# Patient Record
Sex: FEMALE | URBAN - METROPOLITAN AREA
[De-identification: names, ages, dates, MRNs, and addresses within clinical notes are randomized per-mention and may not be internally consistent; named-entity substitution may affect disease eponyms.]

---

## 2024-04-16 ENCOUNTER — APPOINTMENT (RX ONLY)
Dept: URBAN - METROPOLITAN AREA CLINIC 54 | Facility: CLINIC | Age: 39
Setting detail: DERMATOLOGY
End: 2024-04-16

## 2024-04-16 DIAGNOSIS — D485 NEOPLASM OF UNCERTAIN BEHAVIOR OF SKIN: ICD-10-CM

## 2024-04-16 DIAGNOSIS — L73.2 HIDRADENITIS SUPPURATIVA: ICD-10-CM

## 2024-04-16 PROBLEM — D48.5 NEOPLASM OF UNCERTAIN BEHAVIOR OF SKIN: Status: ACTIVE | Noted: 2024-04-16

## 2024-04-16 PROCEDURE — ? ORDER TESTS

## 2024-04-16 PROCEDURE — 99203 OFFICE O/P NEW LOW 30 MIN: CPT

## 2024-04-16 PROCEDURE — ? DEFER

## 2024-04-16 PROCEDURE — ? COUNSELING

## 2024-04-16 PROCEDURE — ? OTHER

## 2024-04-16 NOTE — HPI: SKIN LESION
How Severe Is Your Skin Lesion?: mild
Is This A New Presentation, Or A Follow-Up?: Growths
Additional History: Patient stated she has been diagnosed with HS, and has had multiple surgeries.

## 2024-04-16 NOTE — PROCEDURE: DEFER
Detail Level: Detailed
Instructions (Optional): A) Left Upper Back  R/O EIC
Introduction Text (Please End With A Colon): The following procedure was deferred
X Size Of Lesion In Cm (Optional): 1.5
Procedure To Be Performed At Next Visit: Excision
Size Of Lesion In Cm (Optional): 1.6

## 2024-04-16 NOTE — PROCEDURE: ORDER TESTS
Billing Type: Third-Party Bill
Performing Laboratory: 0
Expected Date Of Service: 04/16/2024
Bill For Surgical Tray: no

## 2024-04-16 NOTE — PROCEDURE: OTHER
Note Text (......Xxx Chief Complaint.): This diagnosis correlates with the
Detail Level: Zone
Render Risk Assessment In Note?: no
Other (Free Text): Patient is already on clindamycin and doxycycline provided by PCP. Does not take doxy often due to it causing UTI’S. Previously prescribed Humira from a different dermatology office, reported minimal improvement.

## 2024-04-16 NOTE — PROCEDURE: MIPS QUALITY
Quality 130: Documentation Of Current Medications In The Medical Record: Current Medications Documented
Quality 431: Preventive Care And Screening: Unhealthy Alcohol Use - Screening: Patient not identified as an unhealthy alcohol user when screened for unhealthy alcohol use using a systematic screening method
Quality 394a: Meningococcal Immunizations For Adolescents: Patient did not have one dose of meningococcal vaccine on or between the patient's 11th and 13th birthdays.
Detail Level: Detailed
Quality 226: Preventive Care And Screening: Tobacco Use: Screening And Cessation Intervention: Patient screened for tobacco use and is an ex/non-smoker

## 2024-05-07 ENCOUNTER — APPOINTMENT (RX ONLY)
Dept: URBAN - METROPOLITAN AREA CLINIC 54 | Facility: CLINIC | Age: 39
Setting detail: DERMATOLOGY
End: 2024-05-07

## 2024-05-07 DIAGNOSIS — D485 NEOPLASM OF UNCERTAIN BEHAVIOR OF SKIN: ICD-10-CM

## 2024-05-07 DIAGNOSIS — L73.2 HIDRADENITIS SUPPURATIVA: ICD-10-CM

## 2024-05-07 PROBLEM — D48.5 NEOPLASM OF UNCERTAIN BEHAVIOR OF SKIN: Status: ACTIVE | Noted: 2024-05-07

## 2024-05-07 PROCEDURE — 99213 OFFICE O/P EST LOW 20 MIN: CPT

## 2024-05-07 PROCEDURE — ? DEFER

## 2024-05-07 PROCEDURE — ? OTHER

## 2024-05-07 PROCEDURE — ? PRESCRIPTION

## 2024-05-07 PROCEDURE — ? COUNSELING

## 2024-05-07 PROCEDURE — ? TREATMENT REGIMEN

## 2024-05-07 RX ORDER — SECUKINUMAB 150 MG/ML
INJECTION SUBCUTANEOUS
Qty: 1 | Refills: 0 | Status: ERX | COMMUNITY
Start: 2024-05-07

## 2024-05-07 RX ORDER — SECUKINUMAB 150 MG/ML
INJECTION SUBCUTANEOUS
Qty: 5 | Refills: 0 | Status: ERX

## 2024-05-07 RX ADMIN — SECUKINUMAB: 150 INJECTION SUBCUTANEOUS at 00:00

## 2024-05-22 RX ORDER — SECUKINUMAB 150 MG/ML
INJECTION SUBCUTANEOUS
Qty: 1 | Refills: 0 | Status: ERX

## 2024-05-22 RX ORDER — SECUKINUMAB 150 MG/ML
INJECTION SUBCUTANEOUS
Qty: 5 | Refills: 0 | Status: ERX

## 2024-11-21 ENCOUNTER — APPOINTMENT (RX ONLY)
Dept: URBAN - METROPOLITAN AREA CLINIC 54 | Facility: CLINIC | Age: 39
Setting detail: DERMATOLOGY
End: 2024-11-21

## 2024-11-21 ENCOUNTER — RX ONLY (OUTPATIENT)
Age: 39
Setting detail: RX ONLY
End: 2024-11-21

## 2024-11-21 DIAGNOSIS — L73.2 HIDRADENITIS SUPPURATIVA: ICD-10-CM

## 2024-11-21 PROCEDURE — ? TREATMENT REGIMEN

## 2024-11-21 PROCEDURE — 99213 OFFICE O/P EST LOW 20 MIN: CPT

## 2024-11-21 PROCEDURE — ? COUNSELING

## 2024-11-21 PROCEDURE — ? PRESCRIPTION

## 2024-11-21 RX ORDER — CLINDAMYCIN PHOSPHATE 10 MG/ML
SOLUTION TOPICAL BID
Qty: 60 | Refills: 0 | Status: CANCELLED

## 2024-11-21 RX ORDER — CLINDAMYCIN PHOSPHATE 10 MG/ML
SOLUTION TOPICAL BID
Qty: 60 | Refills: 0 | Status: CANCELLED | COMMUNITY
Start: 2024-11-21

## 2024-11-21 RX ORDER — CLINDAMYCIN PHOSPHATE 10 MG/ML
SOLUTION TOPICAL
Qty: 60 | Refills: 0 | Status: ERX

## 2024-11-21 RX ORDER — CHLORHEXIDINE GLUCONATE 4 G/100ML
SOLUTION TOPICAL BID
Qty: 946 | Refills: 0 | Status: CANCELLED | COMMUNITY
Start: 2024-11-21

## 2024-11-21 RX ORDER — CHLORHEXIDINE GLUCONATE 4 G/100ML
SOLUTION TOPICAL BID
Qty: 946 | Refills: 0 | Status: ERX

## 2024-11-21 RX ADMIN — CLINDAMYCIN PHOSPHATE: 10 SOLUTION TOPICAL at 00:00

## 2024-11-21 RX ADMIN — CHLORHEXIDINE GLUCONATE: 4 SOLUTION TOPICAL at 00:00

## 2025-01-21 ENCOUNTER — APPOINTMENT (OUTPATIENT)
Dept: URBAN - METROPOLITAN AREA CLINIC 54 | Facility: CLINIC | Age: 40
Setting detail: DERMATOLOGY
End: 2025-01-21

## 2025-01-21 ENCOUNTER — RX ONLY (RX ONLY)
Age: 40
End: 2025-01-21

## 2025-01-21 DIAGNOSIS — L73.2 HIDRADENITIS SUPPURATIVA: ICD-10-CM | Status: WORSENING

## 2025-01-21 DIAGNOSIS — L91.0 HYPERTROPHIC SCAR: ICD-10-CM

## 2025-01-21 DIAGNOSIS — D485 NEOPLASM OF UNCERTAIN BEHAVIOR OF SKIN: ICD-10-CM

## 2025-01-21 PROBLEM — D48.5 NEOPLASM OF UNCERTAIN BEHAVIOR OF SKIN: Status: ACTIVE | Noted: 2025-01-21

## 2025-01-21 PROCEDURE — 11900 INJECT SKIN LESIONS </W 7: CPT

## 2025-01-21 PROCEDURE — 99213 OFFICE O/P EST LOW 20 MIN: CPT | Mod: 25

## 2025-01-21 PROCEDURE — ? COUNSELING

## 2025-01-21 PROCEDURE — ? TREATMENT REGIMEN

## 2025-01-21 PROCEDURE — ? SEPARATE AND IDENTIFIABLE DOCUMENTATION

## 2025-01-21 PROCEDURE — ? DEFER

## 2025-01-21 PROCEDURE — ? INTRALESIONAL KENALOG

## 2025-01-21 PROCEDURE — ? CONSULTATION FOR EXCISION

## 2025-01-21 RX ORDER — SECUKINUMAB 150 MG/ML
INJECTION SUBCUTANEOUS
Qty: 1 | Refills: 3 | Status: ERX

## 2025-01-21 RX ORDER — CLINDAMYCIN PHOSPHATE AND BENZOYL PEROXIDE 1 %-5 %
KIT TOPICAL
Qty: 50 | Refills: 0 | Status: ERX

## 2025-01-21 RX ORDER — CLINDAMYCIN PHOSPHATE AND BENZOYL PEROXIDE 1 %-5 %
KIT TOPICAL
Qty: 50 | Refills: 0 | Status: CANCELLED | COMMUNITY
Start: 2025-01-21

## 2025-01-21 ASSESSMENT — LOCATION DETAILED DESCRIPTION DERM
LOCATION DETAILED: LEFT LATERAL TRAPEZIAL NECK
LOCATION DETAILED: LEFT INFERIOR POSTERIOR NECK

## 2025-01-21 ASSESSMENT — LOCATION SIMPLE DESCRIPTION DERM: LOCATION SIMPLE: POSTERIOR NECK

## 2025-01-21 ASSESSMENT — LOCATION ZONE DERM: LOCATION ZONE: NECK

## 2025-01-22 ENCOUNTER — RX ONLY (RX ONLY)
Age: 40
End: 2025-01-22

## 2025-01-22 RX ORDER — SECUKINUMAB 150 MG/ML
INJECTION SUBCUTANEOUS
Qty: 1 | Refills: 11 | Status: ERX

## 2025-03-06 ENCOUNTER — APPOINTMENT (OUTPATIENT)
Dept: URBAN - METROPOLITAN AREA CLINIC 54 | Facility: CLINIC | Age: 40
Setting detail: DERMATOLOGY
End: 2025-03-06

## 2025-03-06 DIAGNOSIS — D485 NEOPLASM OF UNCERTAIN BEHAVIOR OF SKIN: ICD-10-CM

## 2025-03-06 PROBLEM — D48.5 NEOPLASM OF UNCERTAIN BEHAVIOR OF SKIN: Status: ACTIVE | Noted: 2025-03-06

## 2025-03-06 PROCEDURE — ? COUNSELING

## 2025-03-06 PROCEDURE — ? EXCISION

## 2025-03-06 PROCEDURE — 13100 CMPLX RPR TRUNK 1.1-2.5 CM: CPT

## 2025-03-06 PROCEDURE — 11402 EXC TR-EXT B9+MARG 1.1-2 CM: CPT

## 2025-03-06 PROCEDURE — ? DEFER

## 2025-03-06 ASSESSMENT — LOCATION SIMPLE DESCRIPTION DERM: LOCATION SIMPLE: LEFT UPPER BACK

## 2025-03-06 ASSESSMENT — LOCATION DETAILED DESCRIPTION DERM: LOCATION DETAILED: LEFT SUPERIOR UPPER BACK

## 2025-03-06 ASSESSMENT — LOCATION ZONE DERM: LOCATION ZONE: TRUNK

## 2025-03-20 ENCOUNTER — APPOINTMENT (OUTPATIENT)
Dept: URBAN - METROPOLITAN AREA CLINIC 54 | Facility: CLINIC | Age: 40
Setting detail: DERMATOLOGY
End: 2025-03-20

## 2025-03-20 DIAGNOSIS — Z48.02 ENCOUNTER FOR REMOVAL OF SUTURES: ICD-10-CM

## 2025-03-20 PROCEDURE — ? COUNSELING

## 2025-03-20 PROCEDURE — ? SUTURE REMOVAL

## 2025-03-20 ASSESSMENT — LOCATION SIMPLE DESCRIPTION DERM: LOCATION SIMPLE: LEFT UPPER BACK

## 2025-03-20 ASSESSMENT — LOCATION DETAILED DESCRIPTION DERM: LOCATION DETAILED: LEFT SUPERIOR UPPER BACK

## 2025-03-20 ASSESSMENT — LOCATION ZONE DERM: LOCATION ZONE: TRUNK
